# Patient Record
Sex: MALE | Race: WHITE | NOT HISPANIC OR LATINO | Employment: FULL TIME | URBAN - METROPOLITAN AREA
[De-identification: names, ages, dates, MRNs, and addresses within clinical notes are randomized per-mention and may not be internally consistent; named-entity substitution may affect disease eponyms.]

---

## 2017-03-01 ENCOUNTER — HOSPITAL ENCOUNTER (OUTPATIENT)
Dept: ORTHOPEDIC SURGERY | Facility: CLINIC | Age: 52
Discharge: HOME | End: 2017-03-01
Attending: ORTHOPAEDIC SURGERY | Admitting: ORTHOPAEDIC SURGERY

## 2017-03-06 ENCOUNTER — HOSPITAL ENCOUNTER (OUTPATIENT)
Dept: CT IMAGING | Facility: HOSPITAL | Age: 52
Discharge: HOME | End: 2017-03-06
Attending: ORTHOPAEDIC SURGERY | Admitting: ORTHOPAEDIC SURGERY

## 2017-03-17 ENCOUNTER — HOSPITAL ENCOUNTER (OUTPATIENT)
Dept: PHYSICAL THERAPY | Facility: HOSPITAL | Age: 52
Setting detail: RECURRING SERIES
Discharge: HOME | End: 2017-06-30
Attending: ORTHOPAEDIC SURGERY | Admitting: ORTHOPAEDIC SURGERY

## 2017-03-27 ENCOUNTER — HOSPITAL ENCOUNTER (OUTPATIENT)
Dept: ORTHOPEDIC SURGERY | Facility: CLINIC | Age: 52
Discharge: HOME | End: 2017-03-27
Attending: ORTHOPAEDIC SURGERY | Admitting: ORTHOPAEDIC SURGERY

## 2017-06-26 ENCOUNTER — HOSPITAL ENCOUNTER (OUTPATIENT)
Dept: ORTHOPEDIC SURGERY | Facility: CLINIC | Age: 52
Discharge: HOME | End: 2017-06-26
Attending: ORTHOPAEDIC SURGERY | Admitting: ORTHOPAEDIC SURGERY

## 2017-07-03 ENCOUNTER — HOSPITAL ENCOUNTER (OUTPATIENT)
Dept: PHYSICAL THERAPY | Facility: HOSPITAL | Age: 52
Setting detail: RECURRING SERIES
Discharge: HOME | End: 2017-07-28
Attending: ORTHOPAEDIC SURGERY | Admitting: ORTHOPAEDIC SURGERY

## 2017-09-25 ENCOUNTER — HOSPITAL ENCOUNTER (OUTPATIENT)
Dept: ORTHOPEDIC SURGERY | Facility: CLINIC | Age: 52
Discharge: HOME | End: 2017-09-25
Attending: ORTHOPAEDIC SURGERY | Admitting: ORTHOPAEDIC SURGERY

## 2020-07-09 ENCOUNTER — OFFICE VISIT (OUTPATIENT)
Dept: ORTHOPEDIC SURGERY | Facility: CLINIC | Age: 55
End: 2020-07-09

## 2020-07-09 VITALS — WEIGHT: 164 LBS | BODY MASS INDEX: 25.74 KG/M2 | HEIGHT: 67 IN

## 2020-07-09 DIAGNOSIS — M19.011 OSTEOARTHRITIS OF RIGHT GLENOHUMERAL JOINT: ICD-10-CM

## 2020-07-09 DIAGNOSIS — M25.511 ACUTE PAIN OF RIGHT SHOULDER: Primary | ICD-10-CM

## 2020-07-09 PROCEDURE — 20610 DRAIN/INJ JOINT/BURSA W/O US: CPT | Performed by: ORTHOPAEDIC SURGERY

## 2020-07-09 PROCEDURE — 99213 OFFICE O/P EST LOW 20 MIN: CPT | Performed by: ORTHOPAEDIC SURGERY

## 2020-07-09 RX ORDER — TRIAMCINOLONE ACETONIDE 40 MG/ML
80 INJECTION, SUSPENSION INTRA-ARTICULAR; INTRAMUSCULAR ONCE
Status: COMPLETED | OUTPATIENT
Start: 2020-07-09 | End: 2020-07-09

## 2020-07-09 RX ORDER — MELOXICAM 15 MG/1
15 TABLET ORAL DAILY PRN
Qty: 30 TABLET | Refills: 4 | Status: SHIPPED | OUTPATIENT
Start: 2020-07-09 | End: 2021-08-16

## 2020-07-09 RX ADMIN — TRIAMCINOLONE ACETONIDE 80 MG: 40 INJECTION, SUSPENSION INTRA-ARTICULAR; INTRAMUSCULAR at 13:15

## 2020-07-09 NOTE — PROGRESS NOTES
"     Patient ID: Marcella Gillis is a 55 y.o. male.  Right shoulder pain  Marcella is a 55-year-old gentleman here with right shoulder pain for many years.  It is worsened in the last 6 to 12 months.  He has grinding and popping in the shoulder with pain at night and on overhead activity.  Pain is sharp and a 6/10 and previously was improved with meloxicam    Review of Systems:  Right shoulder pain  Denies chest pain      Objective:    Ht 170.2 cm (67\")   Wt 74.4 kg (164 lb)   BMI 25.69 kg/m²     Physical Examination:   He is a pleasant male in no distress. He is alert and oriented x3 and appears his stated age.  Right shoulder demonstrates no scars and no atrophy with pain over the bicep groove.  Passive elevation 160 degrees abduction 120 degrees external rotation 30 degrees internal rotation is S1.  He has mild pain and weakness on belly press, liftoff is 5/5.  Speed, [place], supraspinatus demonstrate mild pain but no weakness.Sensory and motor exam are intact all distributions. Radial pulse is palpable and capillary refill is less than two seconds to all digits      Imaging:   X-rays demonstrate end-stage degenerative joint disease    Assessment:    Right shoulder degenerative joint disease    Plan:  Treatment options discussed, he would like to continue conservative treatment and consider shoulder arthroplasty in the winter.I recommend injection after today's evaluation. Risks and benefits of the injection were discussed. Under sterile technique and written consent I injected 80mg of Kenalog and 2cc of 1% Lidocaine in the shoulder and subacromial space. It was well tolerated          Disclaimer: Please note that areas of this note were completed with computer voice recognition software.  Quite often unanticipated grammatical, syntax, homophones, and other interpretive errors are inadvertently transcribed by the computer software. Please excuse any errors that have escaped final proofreading.  "

## 2021-08-16 ENCOUNTER — TELEPHONE (OUTPATIENT)
Dept: PODIATRY | Facility: CLINIC | Age: 56
End: 2021-08-16

## 2021-08-16 ENCOUNTER — PREP FOR SURGERY (OUTPATIENT)
Dept: OTHER | Facility: HOSPITAL | Age: 56
End: 2021-08-16

## 2021-08-16 ENCOUNTER — OFFICE VISIT (OUTPATIENT)
Dept: ORTHOPEDIC SURGERY | Facility: CLINIC | Age: 56
End: 2021-08-16

## 2021-08-16 VITALS
BODY MASS INDEX: 24.64 KG/M2 | DIASTOLIC BLOOD PRESSURE: 82 MMHG | SYSTOLIC BLOOD PRESSURE: 131 MMHG | HEIGHT: 67 IN | WEIGHT: 157 LBS | HEART RATE: 71 BPM

## 2021-08-16 DIAGNOSIS — M25.512 ACUTE PAIN OF BOTH SHOULDERS: Primary | ICD-10-CM

## 2021-08-16 DIAGNOSIS — M19.011 OSTEOARTHRITIS OF RIGHT GLENOHUMERAL JOINT: ICD-10-CM

## 2021-08-16 DIAGNOSIS — M19.011 OSTEOARTHRITIS OF RIGHT GLENOHUMERAL JOINT: Primary | ICD-10-CM

## 2021-08-16 DIAGNOSIS — M25.511 ACUTE PAIN OF RIGHT SHOULDER: ICD-10-CM

## 2021-08-16 DIAGNOSIS — M25.511 ACUTE PAIN OF BOTH SHOULDERS: Primary | ICD-10-CM

## 2021-08-16 PROCEDURE — 99214 OFFICE O/P EST MOD 30 MIN: CPT | Performed by: ORTHOPAEDIC SURGERY

## 2021-08-16 PROCEDURE — 20610 DRAIN/INJ JOINT/BURSA W/O US: CPT | Performed by: ORTHOPAEDIC SURGERY

## 2021-08-16 RX ORDER — PREGABALIN 75 MG/1
150 CAPSULE ORAL ONCE
Status: CANCELLED | OUTPATIENT
Start: 2021-08-16 | End: 2021-08-16

## 2021-08-16 RX ORDER — TRIAMCINOLONE ACETONIDE 40 MG/ML
40 INJECTION, SUSPENSION INTRA-ARTICULAR; INTRAMUSCULAR ONCE
Status: DISCONTINUED | OUTPATIENT
Start: 2021-08-16 | End: 2021-08-16

## 2021-08-16 RX ORDER — METHYLPREDNISOLONE ACETATE 80 MG/ML
80 INJECTION, SUSPENSION INTRA-ARTICULAR; INTRALESIONAL; INTRAMUSCULAR; SOFT TISSUE ONCE
Status: COMPLETED | OUTPATIENT
Start: 2021-08-16 | End: 2021-08-16

## 2021-08-16 RX ORDER — METHYLPREDNISOLONE ACETATE 40 MG/ML
80 INJECTION, SUSPENSION INTRA-ARTICULAR; INTRALESIONAL; INTRAMUSCULAR; SOFT TISSUE ONCE
Status: DISCONTINUED | OUTPATIENT
Start: 2021-08-16 | End: 2021-08-16

## 2021-08-16 RX ORDER — MELOXICAM 15 MG/1
15 TABLET ORAL ONCE
Status: CANCELLED | OUTPATIENT
Start: 2021-08-16 | End: 2021-08-16

## 2021-08-16 RX ORDER — OXYCODONE HCL 10 MG/1
10 TABLET, FILM COATED, EXTENDED RELEASE ORAL ONCE
Status: CANCELLED | OUTPATIENT
Start: 2021-08-16 | End: 2021-08-16

## 2021-08-16 RX ORDER — TRANEXAMIC ACID 10 MG/ML
1000 INJECTION, SOLUTION INTRAVENOUS ONCE
Status: CANCELLED | OUTPATIENT
Start: 2021-08-16 | End: 2021-08-16

## 2021-08-16 RX ORDER — METHYLPREDNISOLONE ACETATE 80 MG/ML
80 INJECTION, SUSPENSION INTRA-ARTICULAR; INTRALESIONAL; INTRAMUSCULAR; SOFT TISSUE ONCE
Status: DISCONTINUED | OUTPATIENT
Start: 2021-08-16 | End: 2021-08-16

## 2021-08-16 RX ADMIN — METHYLPREDNISOLONE ACETATE 80 MG: 80 INJECTION, SUSPENSION INTRA-ARTICULAR; INTRALESIONAL; INTRAMUSCULAR; SOFT TISSUE at 14:32

## 2021-08-16 NOTE — PROGRESS NOTES
"     Patient ID: Marcella Gillis is a 56 y.o. male.  Right shoulder pain this is a follow-up for 56-year-old gentleman here with right shoulder pain secondary to degenerative joint disease.  He was seen in  July of last year and had a cortisone injection  Left shoulder is postop from shoulder arthroplasty in 2017.  Minimal to no pain    Review of Systems:  Bilateral shoulder pain      Objective:    /82   Pulse 71   Ht 170.2 cm (67\")   Wt 71.2 kg (157 lb)   BMI 24.59 kg/m²     Physical Examination:  Left shoulder demonstrates healed incision no tenderness active elevation 180 abduction 150 external rotation 50 internal rotation L5 with intact subscap.  Right shoulder demonstrates passive elevation 160 abduction 120 external rotation 20.  Belly press and liftoff are 5/5.  Sensory and motor exam are intact all distributions. Radial pulse is palpable and capillary refill is less than two seconds to all digits.       Imaging:   bilateral Shoulder X-Ray  Indication: Postop total shoulder left side, chronic right shoulder pain  AP Y and Lateral views  Findings: End-stage of joint disease with significant glenoid retroversion on the right, unchanged overall appearance of left total shoulder arthroplasty.  May be some slight glenoid subsidence but overall alignment and position is maintained  no bony lesion  Soft tissues normal  decreased joint spaces  Hardware appropriately positioned yes      yes prior studies available for comparison    Assessment:    Doing well after left shoulder arthroplasty  Right shoulder degenerative joint disease    Plan:   Continue activity as tolerated for the left side.  For the right side he would like to consider surgery possibly in about 6 months.  In the meantime, I recommend injection after today's evaluation. Risks and benefits of the injection were discussed. Under sterile technique and written consent I injected 80 mg of Depo-Medrol and 2 mL of 1% Lidocaine in the shoulder and " subacromial space. It was well tolerated.  Obtain MRI of the right shoulder and plan for right total shoulder arthroplasty possible reverse arthroplasty in the winter  I discussed the risks including bleeding, scar, infection, stiffness, nerve artery vein and tendon damage, instability, fracture, dvt, loss of life or limb. Issues of cementless fixation and component revision were also discussed. Questions were answered and addressed      Procedures          Disclaimer: Please note that areas of this note were completed with computer voice recognition software.  Quite often unanticipated grammatical, syntax, homophones, and other interpretive errors are inadvertently transcribed by the computer software. Please excuse any errors that have escaped final proofreading.

## 2021-08-16 NOTE — TELEPHONE ENCOUNTER
PATIENT LEFT FORMS AT TODAYS APPOINTMENT WANTING DR OWENS TO BACK DATE HIM BEING OFF WORK FOR 7/19/2021-8/5/2021.  PATIENT WAS CALLED AND ADVISED WE ARE NOT ABLE TO DO THIS PER DR COLEMAN.      PATIENT STATED DR OWENS WAS GOING TO GIVE HIM A NAME OF A PCP AND HE WANTED THAT INFORMATION.  I SPOKE TO REANNA ABOUT THIS AND SHE WILL PUT IN REFERRAL.

## 2021-08-17 DIAGNOSIS — Z00.00 ANNUAL PHYSICAL EXAM: Primary | ICD-10-CM

## 2021-09-20 ENCOUNTER — OFFICE VISIT (OUTPATIENT)
Dept: FAMILY MEDICINE CLINIC | Facility: CLINIC | Age: 56
End: 2021-09-20

## 2021-09-20 VITALS
DIASTOLIC BLOOD PRESSURE: 86 MMHG | BODY MASS INDEX: 25.55 KG/M2 | TEMPERATURE: 98.6 F | HEIGHT: 66 IN | RESPIRATION RATE: 16 BRPM | HEART RATE: 85 BPM | WEIGHT: 159 LBS | OXYGEN SATURATION: 98 % | SYSTOLIC BLOOD PRESSURE: 132 MMHG

## 2021-09-20 DIAGNOSIS — M19.011 PRIMARY OSTEOARTHRITIS OF RIGHT SHOULDER: ICD-10-CM

## 2021-09-20 DIAGNOSIS — Z11.59 NEED FOR HEPATITIS C SCREENING TEST: ICD-10-CM

## 2021-09-20 DIAGNOSIS — Z12.5 SCREENING FOR MALIGNANT NEOPLASM OF PROSTATE: ICD-10-CM

## 2021-09-20 DIAGNOSIS — Z00.00 ROUTINE PHYSICAL EXAMINATION: Primary | ICD-10-CM

## 2021-09-20 DIAGNOSIS — F51.01 PRIMARY INSOMNIA: ICD-10-CM

## 2021-09-20 DIAGNOSIS — Z12.11 ENCOUNTER FOR SCREENING COLONOSCOPY: ICD-10-CM

## 2021-09-20 DIAGNOSIS — Z13.220 SCREENING CHOLESTEROL LEVEL: ICD-10-CM

## 2021-09-20 PROCEDURE — 99396 PREV VISIT EST AGE 40-64: CPT | Performed by: PHYSICIAN ASSISTANT

## 2021-09-20 RX ORDER — CELECOXIB 100 MG/1
100 CAPSULE ORAL 2 TIMES DAILY PRN
Qty: 60 CAPSULE | Refills: 5 | Status: SHIPPED | OUTPATIENT
Start: 2021-09-20

## 2021-09-20 RX ORDER — TRAZODONE HYDROCHLORIDE 100 MG/1
100 TABLET ORAL NIGHTLY
Qty: 30 TABLET | Refills: 5 | Status: SHIPPED | OUTPATIENT
Start: 2021-09-20

## 2021-09-20 NOTE — PROGRESS NOTES
Subjective   Marcella Gillis is a 56 y.o. male.     Pt is new patient here to establish.   Pt sees Dr. Carr for his shoulder issues.  He has had left shoulder replacement and is now going to have to right shoulder replacement also but likely not until March due to needing to work.  He has had labs ordered by Dr. Carr but hasn't had them done yet.    He was previously taking meloxicam for his arthritis which maybe slightly helped but he would like to try something different to see if something will work better.  He was also prescribed trazodone for his insomnia in the past which did help.  He has never a colon cancer screening.  He is smoking about 1/3 ppd.  He did quit once in the past but then restarted again when he was fishing with a friend that smoked.  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         The following portions of the patient's history were reviewed and updated as appropriate: allergies, current medications, past family history, past medical history, past social history, past surgical history and problem list.  Past Medical History:   Diagnosis Date   • Arthritis      Past Surgical History:   Procedure Laterality Date   • SHOULDER SURGERY Left 2017     History reviewed. No pertinent family history.  Social History     Socioeconomic History   • Marital status: Single     Spouse name: Not on file   • Number of children: Not on file   • Years of education: Not on file   • Highest education level: Not on file   Tobacco Use   • Smoking status: Current Every Day Smoker   Substance and Sexual Activity   • Alcohol use: Yes       No current outpatient medications on file.    Review of Systems   Constitutional: Negative for activity change, appetite change, chills, diaphoresis, fatigue, fever, unexpected weight gain and unexpected weight loss.   Respiratory: Negative for chest tightness and shortness of breath.    Cardiovascular: Negative for chest pain, palpitations and leg swelling.  "  Gastrointestinal: Negative for abdominal pain, anal bleeding, blood in stool, constipation, diarrhea, nausea and vomiting.   Musculoskeletal: Positive for arthralgias. Negative for gait problem.   Skin: Negative for rash.   Neurological: Negative for dizziness, weakness, light-headedness and confusion.     /86 (BP Location: Left arm, Patient Position: Sitting, Cuff Size: Adult)   Pulse 85   Temp 98.6 °F (37 °C) (Temporal)   Resp 16   Ht 167.5 cm (65.95\")   Wt 72.1 kg (159 lb)   SpO2 98%   BMI 25.71 kg/m²       Objective   Physical Exam  Vitals and nursing note reviewed.   Constitutional:       Appearance: Normal appearance. He is obese.   HENT:      Head: Normocephalic and atraumatic.      Right Ear: Tympanic membrane, ear canal and external ear normal.      Left Ear: Tympanic membrane, ear canal and external ear normal.      Nose: Nose normal.      Mouth/Throat:      Mouth: Mucous membranes are moist.      Pharynx: Oropharynx is clear.   Eyes:      Conjunctiva/sclera: Conjunctivae normal.      Pupils: Pupils are equal, round, and reactive to light.   Neck:      Thyroid: No thyroid mass, thyromegaly or thyroid tenderness.      Vascular: No carotid bruit.   Cardiovascular:      Rate and Rhythm: Normal rate and regular rhythm.      Heart sounds: Normal heart sounds.   Pulmonary:      Effort: Pulmonary effort is normal.      Breath sounds: Normal breath sounds.   Abdominal:      General: Abdomen is flat. Bowel sounds are normal.      Palpations: Abdomen is soft.   Musculoskeletal:      Cervical back: Normal range of motion and neck supple.      Right lower leg: No edema.      Left lower leg: No edema.   Lymphadenopathy:      Cervical: No cervical adenopathy.   Skin:     General: Skin is warm and dry.   Neurological:      Mental Status: He is alert and oriented to person, place, and time.   Psychiatric:         Mood and Affect: Mood normal.         Thought Content: Thought content normal. "         Procedures     Assessment/Plan   Diagnoses and all orders for this visit:    1. Routine physical examination (Primary)    2. Primary osteoarthritis of right shoulder  -     celecoxib (CeleBREX) 100 MG capsule; Take 1 capsule by mouth 2 (Two) Times a Day As Needed for Mild Pain  or Moderate Pain .  Dispense: 60 capsule; Refill: 5    3. Encounter for screening colonoscopy  -     Ambulatory Referral For Screening Colonoscopy    4. Need for hepatitis C screening test  -     Hepatitis C Antibody; Future    5. Screening cholesterol level  -     Lipid panel; Future    6. Primary insomnia  -     traZODone (DESYREL) 100 MG tablet; Take 1 tablet by mouth Every Night.  Dispense: 30 tablet; Refill: 5      Routine physical done today.  Pt to work on healthy diet and smoking cessation when ready to quit.  Will refill his trazodone and also try on celebrex to see if this works better for his arthritis pain.  Pt to make appointment with dentist and eye doctor.  Referral entered for screening colonoscopy.  Pt to also get fasting labs done at his convenience along with the labs ordered by Dr. Carr.

## 2023-08-25 ENCOUNTER — TELEPHONE (OUTPATIENT)
Dept: ORTHOPEDIC SURGERY | Facility: CLINIC | Age: 58
End: 2023-08-25

## 2023-08-25 NOTE — TELEPHONE ENCOUNTER
Caller: MARGIE HAINES     Relationship to patient: PATIENT    Best call back number: [phone]    Chief complaint: RIGHT KNEE FLUID    Type of visit: NEW PROBLEM     Requested date: ASAP      If rescheduling, when is the original appointment: N/A     Additional notes:PATIENT REQUEST SCHEDULING ASAP DUE TO PAIN LEVEL  REFERRAL IN SCHEDULE REVIEW FROM [facility] FOR DR COLEMAN.  PATIENT ESTABLISHED WITH DR COLEMAN FOR SHOULDER.  DR COLEMAN KNEE DX  UNDER 50 NOT REPLACEMENT.

## 2023-08-28 ENCOUNTER — OFFICE VISIT (OUTPATIENT)
Dept: ORTHOPEDIC SURGERY | Facility: CLINIC | Age: 58
End: 2023-08-28
Payer: COMMERCIAL

## 2023-08-28 VITALS — HEIGHT: 67 IN | WEIGHT: 154 LBS | HEART RATE: 87 BPM | BODY MASS INDEX: 24.17 KG/M2

## 2023-08-28 DIAGNOSIS — M17.11 PRIMARY LOCALIZED OSTEOARTHRITIS OF RIGHT KNEE: Primary | ICD-10-CM

## 2023-08-28 PROCEDURE — 99213 OFFICE O/P EST LOW 20 MIN: CPT | Performed by: ORTHOPAEDIC SURGERY

## 2023-08-28 RX ORDER — MELOXICAM 15 MG/1
15 TABLET ORAL DAILY PRN
Qty: 30 TABLET | Refills: 4 | Status: SHIPPED | OUTPATIENT
Start: 2023-08-28

## 2023-08-28 NOTE — PROGRESS NOTES
"     Patient ID: Marcella Gillis is a 58 y.o. male.    Chief Complaint:    Chief Complaint   Patient presents with    Right Knee - Initial Evaluation, Pain     Pain 5       HPI:  This is a 58-year-old gentleman with about a year of right knee pain worse in the last couple weeks or so does a lot of walking and puts a lot of wear and tear on his knee at his job.  His knee swelled up pretty significantly a month or so ago but has been improving.  He has been taking some naproxen occasionally with some improvement no mechanical complaints  Past Medical History:   Diagnosis Date    Arthritis        Past Surgical History:   Procedure Laterality Date    SHOULDER SURGERY Left 2017       History reviewed. No pertinent family history.       Social History     Occupational History    Not on file   Tobacco Use    Smoking status: Every Day     Packs/day: 0.33     Years: 40.00     Pack years: 13.20     Types: Cigarettes    Smokeless tobacco: Never   Vaping Use    Vaping Use: Some days    Substances: Flavoring    Devices: Disposable   Substance and Sexual Activity    Alcohol use: Yes    Drug use: Never    Sexual activity: Defer      Review of Systems   Cardiovascular:  Negative for chest pain.   Musculoskeletal:  Positive for arthralgias.     Objective:    Pulse 87   Ht 170.2 cm (67\")   Wt 69.9 kg (154 lb)   BMI 24.12 kg/mý     Physical Examination:  Right knee demonstrates mild to moderate effusion no redness or warmth superficial abrasion over the VMO mild medial joint line tenderness range of motion 0-1 20 no instability with mild pain on medial Cathleen  Sensory and motor exam are intact in all distributions. Dorsalis pedis and posterior tibialis pulses are palpable and capillary refill is less than two seconds to all digits.    Imaging:  Prior x-rays demonstrate moderate tricompartmental degenerative disease    Assessment:  Moderate degenerative joint disease right knee    Plan:  Treatment options discussed at this point I " would change over to a long-term anti-inflammatory so I wrote for meloxicam if symptoms return consider injection call me anytime      Procedures         Disclaimer: Part of this note may be an electronic transcription/translation of spoken language to printed text using the Dragon Dictation System

## 2023-09-22 ENCOUNTER — TELEPHONE (OUTPATIENT)
Dept: ORTHOPEDIC SURGERY | Facility: CLINIC | Age: 58
End: 2023-09-22
Payer: COMMERCIAL

## 2023-09-22 RX ORDER — MELOXICAM 15 MG/1
15 TABLET ORAL DAILY PRN
Qty: 30 TABLET | Refills: 4 | Status: SHIPPED | OUTPATIENT
Start: 2023-09-22

## 2023-09-22 NOTE — TELEPHONE ENCOUNTER
Caller: Elis Marcella RICKEY    Relationship: Self    Best call back number: [phone]      Requested Prescriptions:   Requested Prescriptions     Pending Prescriptions Disp Refills    meloxicam (MOBIC) 15 MG tablet 30 tablet 4     Sig: Take 1 tablet by mouth Daily As Needed for Moderate Pain.        Pharmacy where request should be sent: [facility] [id number] [place] - [address] - [phone] PH - [phone] FX     Last office visit with prescribing clinician: 8/28/2023   Last telemedicine visit with prescribing clinician: Visit date not found   Next office visit with prescribing clinician: Visit date not found     Additional details provided by patient: WOULD LIKE 90 DAY SUPPLY     Does the patient have less than a 3 day supply:  [x] Yes  [] No    Would you like a call back once the refill request has been completed: [x] Yes [] No    If the office needs to give you a call back, can they leave a voicemail: [x] Yes [] No    Serena Dooley Rep   09/22/23 13:49 EDT